# Patient Record
Sex: MALE | Race: BLACK OR AFRICAN AMERICAN | NOT HISPANIC OR LATINO
[De-identification: names, ages, dates, MRNs, and addresses within clinical notes are randomized per-mention and may not be internally consistent; named-entity substitution may affect disease eponyms.]

---

## 2017-12-27 ENCOUNTER — TRANSCRIPTION ENCOUNTER (OUTPATIENT)
Age: 52
End: 2017-12-27

## 2018-06-23 ENCOUNTER — TRANSCRIPTION ENCOUNTER (OUTPATIENT)
Age: 53
End: 2018-06-23

## 2020-04-26 ENCOUNTER — MESSAGE (OUTPATIENT)
Age: 55
End: 2020-04-26

## 2020-08-20 ENCOUNTER — APPOINTMENT (OUTPATIENT)
Dept: INTERNAL MEDICINE | Facility: CLINIC | Age: 55
End: 2020-08-20

## 2020-08-20 VITALS
HEART RATE: 76 BPM | BODY MASS INDEX: 24.11 KG/M2 | OXYGEN SATURATION: 98 % | DIASTOLIC BLOOD PRESSURE: 113 MMHG | TEMPERATURE: 98.1 F | WEIGHT: 178 LBS | SYSTOLIC BLOOD PRESSURE: 182 MMHG | HEIGHT: 72 IN

## 2020-08-24 ENCOUNTER — APPOINTMENT (OUTPATIENT)
Dept: INTERNAL MEDICINE | Facility: CLINIC | Age: 55
End: 2020-08-24
Payer: COMMERCIAL

## 2020-08-24 VITALS
SYSTOLIC BLOOD PRESSURE: 170 MMHG | TEMPERATURE: 98.3 F | DIASTOLIC BLOOD PRESSURE: 111 MMHG | HEART RATE: 89 BPM | BODY MASS INDEX: 24.11 KG/M2 | WEIGHT: 178 LBS | OXYGEN SATURATION: 98 % | RESPIRATION RATE: 14 BRPM | HEIGHT: 72 IN

## 2020-08-24 VITALS
OXYGEN SATURATION: 98 % | BODY MASS INDEX: 24.11 KG/M2 | TEMPERATURE: 98.3 F | HEART RATE: 89 BPM | DIASTOLIC BLOOD PRESSURE: 111 MMHG | WEIGHT: 178 LBS | HEIGHT: 72 IN | SYSTOLIC BLOOD PRESSURE: 170 MMHG

## 2020-08-24 DIAGNOSIS — E55.9 VITAMIN D DEFICIENCY, UNSPECIFIED: ICD-10-CM

## 2020-08-24 DIAGNOSIS — Z86.79 PERSONAL HISTORY OF OTHER DISEASES OF THE CIRCULATORY SYSTEM: ICD-10-CM

## 2020-08-24 DIAGNOSIS — Z78.9 OTHER SPECIFIED HEALTH STATUS: ICD-10-CM

## 2020-08-24 DIAGNOSIS — Z00.00 ENCOUNTER FOR GENERAL ADULT MEDICAL EXAMINATION W/OUT ABNORMAL FINDINGS: ICD-10-CM

## 2020-08-24 DIAGNOSIS — H35.019: ICD-10-CM

## 2020-08-24 DIAGNOSIS — Z72.3 LACK OF PHYSICAL EXERCISE: ICD-10-CM

## 2020-08-24 DIAGNOSIS — Z82.49 FAMILY HISTORY OF ISCHEMIC HEART DISEASE AND OTHER DISEASES OF THE CIRCULATORY SYSTEM: ICD-10-CM

## 2020-08-24 DIAGNOSIS — Z80.0 FAMILY HISTORY OF MALIGNANT NEOPLASM OF DIGESTIVE ORGANS: ICD-10-CM

## 2020-08-24 DIAGNOSIS — I45.10 UNSPECIFIED RIGHT BUNDLE-BRANCH BLOCK: ICD-10-CM

## 2020-08-24 LAB
25(OH)D3 SERPL-MCNC: 28.2 NG/ML
ALBUMIN SERPL ELPH-MCNC: 4.6 G/DL
ALP BLD-CCNC: 134 U/L
ALT SERPL-CCNC: 22 U/L
ANION GAP SERPL CALC-SCNC: 11 MMOL/L
APPEARANCE: CLEAR
AST SERPL-CCNC: 27 U/L
BACTERIA: NEGATIVE
BASOPHILS # BLD AUTO: 0.03 K/UL
BASOPHILS NFR BLD AUTO: 0.5 %
BILIRUB SERPL-MCNC: 0.8 MG/DL
BILIRUBIN URINE: NEGATIVE
BLOOD URINE: NEGATIVE
BUN SERPL-MCNC: 15 MG/DL
CALCIUM SERPL-MCNC: 9.6 MG/DL
CHLORIDE SERPL-SCNC: 105 MMOL/L
CHOLEST SERPL-MCNC: 214 MG/DL
CHOLEST/HDLC SERPL: 3.2 RATIO
CO2 SERPL-SCNC: 24 MMOL/L
COLOR: YELLOW
CREAT SERPL-MCNC: 1.14 MG/DL
EOSINOPHIL # BLD AUTO: 0.12 K/UL
EOSINOPHIL NFR BLD AUTO: 2 %
GLUCOSE QUALITATIVE U: NEGATIVE
GLUCOSE SERPL-MCNC: 94 MG/DL
HBV CORE IGG+IGM SER QL: NONREACTIVE
HBV SURFACE AB SER QL: NONREACTIVE
HBV SURFACE AG SER QL: NONREACTIVE
HCT VFR BLD CALC: 46.7 %
HCV AB SER QL: NONREACTIVE
HCV S/CO RATIO: 0.26 S/CO
HDLC SERPL-MCNC: 67 MG/DL
HEPATITIS A IGG ANTIBODY: REACTIVE
HGB BLD-MCNC: 15.1 G/DL
HIV1+2 AB SPEC QL IA.RAPID: NONREACTIVE
HYALINE CASTS: 0 /LPF
IMM GRANULOCYTES NFR BLD AUTO: 0.2 %
KETONES URINE: NEGATIVE
LDLC SERPL CALC-MCNC: 132 MG/DL
LEUKOCYTE ESTERASE URINE: NEGATIVE
LYMPHOCYTES # BLD AUTO: 2.13 K/UL
LYMPHOCYTES NFR BLD AUTO: 36 %
MAN DIFF?: NORMAL
MCHC RBC-ENTMCNC: 27.2 PG
MCHC RBC-ENTMCNC: 32.3 GM/DL
MCV RBC AUTO: 84 FL
MICROSCOPIC-UA: NORMAL
MONOCYTES # BLD AUTO: 0.3 K/UL
MONOCYTES NFR BLD AUTO: 5.1 %
NEUTROPHILS # BLD AUTO: 3.32 K/UL
NEUTROPHILS NFR BLD AUTO: 56.2 %
NITRITE URINE: NEGATIVE
PH URINE: 6.5
PLATELET # BLD AUTO: 227 K/UL
POTASSIUM SERPL-SCNC: 3.8 MMOL/L
PROT SERPL-MCNC: 7.7 G/DL
PROTEIN URINE: NORMAL
PSA SERPL-MCNC: 0.67 NG/ML
RBC # BLD: 5.56 M/UL
RBC # FLD: 13.9 %
RED BLOOD CELLS URINE: 5 /HPF
SODIUM SERPL-SCNC: 140 MMOL/L
SPECIFIC GRAVITY URINE: 1.02
SQUAMOUS EPITHELIAL CELLS: 0 /HPF
TRIGL SERPL-MCNC: 77 MG/DL
UROBILINOGEN URINE: NORMAL
WBC # FLD AUTO: 5.91 K/UL
WHITE BLOOD CELLS URINE: 1 /HPF

## 2020-08-24 PROCEDURE — 93000 ELECTROCARDIOGRAM COMPLETE: CPT

## 2020-08-24 PROCEDURE — 90471 IMMUNIZATION ADMIN: CPT

## 2020-08-24 PROCEDURE — 99386 PREV VISIT NEW AGE 40-64: CPT | Mod: 25

## 2020-08-24 PROCEDURE — 82270 OCCULT BLOOD FECES: CPT

## 2020-08-24 PROCEDURE — 90715 TDAP VACCINE 7 YRS/> IM: CPT

## 2020-08-24 NOTE — ASSESSMENT
[FreeTextEntry1] : health - he needs dtap vaccine pt explained vaccine side effects hand out given.  He needs colon cancer screening eye exam and is up to date with dental.  \par bmi 24  healthy eating , exercise  Weight loss, exercise, and diet control were discussed and are highly encouraged. Treatment options were given such as, aqua therapy, and contacting a nutritionist. Recommended to use the elliptical, stationary bike, less use of treadmill. Mindful eating was explained to the patient Obesity is associated with worsening asthma, shortness of breath, and potential for cardiac disease, diabetes, and other underlying medical conditions.\par \par \par 2. HPN  Pt exapined  etiology of hypertneison and risks .  \par DIETARY SALT (SODIUM); DASH DIET AND BLOOD PRESSURE:\par To decrease the sodium in your diet: \par · Use fresh vegetables and foods as much as possible.\par · Avoid canned and processed foods. Cured meats such as guan, ham, and sausages are high in salt.\par · Try using different herbs and spices in your cooking instead of salt.\par · In restaurants, avoid foods with sauces, cheese, and cured meats. Ask for low-sodium choices.\par To get more potassium in your diet, eat:\par · Bananas, fresh or dried apricots, peaches, citrus fruits, melons\par · Cauliflower, broccoli, tomatoes, carrots, raw spinach, beet greens, potatoes\par To get more magnesium in your diet, eat:\par · Whole grain foods, leafy green vegetables, dried fruits\par • Fish and seafood, poultry \par To get more calcium in your diet, eat:\par · Nonfat milk, yogurt, and low-fat cheeses \par · [place] and sardines\par · Cooked dried beans\par · Broccoli, kale, and bok adri\par · Tofu or soybean curd\par DASH stands for "dietary approaches to stop hypertension." The DASH diet is low in total and saturated fat. It is rich in fruits, vegetables, and low-fat dairy foods. The diet allows you to get natural fiber, calcium, and magnesium from food. It prevents or lowers high blood pressure. It can also help lower cholesterol in your blood. \par Don't change how you eat all at once. It's much more likely that you'll succeed if you make only one or two small changes at a time. Wait until those changes are a habit, then make a couple more changes. Some good starting steps include: \par Add one serving of vegetables to your meals at lunch and dinner. This is an easy way to help you get more vegetables in your diet. \par Have a piece of fruit as an afternoon or after-dinner snack. One glass of juice at breakfast is not enough fruit in your diet. \par Use half your usual amount of butter, margarine, or salad dressing. \par Buy nonfat salad dressing or nonfat sour cream.\par Follow this guide to select your menu of meals. The number of calories we want you to eat each day will tell you how many servings you can choose from each food group.\par Calories: 1600 2100 2600 3100 Servings Grains 6 7 ½ 10 ½ 12 ½ Vegetables 	 4 4 ½ 5 6 Fruit 4 5 5 6 Dairy (low-fat) 2 ½ 3 3 3 ½ Meat, poultry, fish ½ 1 ½ 2 2 ½ Nuts, seeds ½ ½ ½ 1 Fats and sweets 1 ½ 2 ½ 3 4\par Grains and grain products like breads and cereals provide energy, fiber and vitamins. Whole grains have more of these nutrients. One serving equals one of the following:\par Bagel, 1/2 medium; Barley, cooked 1/2 cup; Biscuit, country style 1 medium; Bread, whole wheat, white 1 slice; Cereals, cold or cooked, 1/2 cup; Cornbread, 1 medium piece; Crackers, mari, 2; Crackers, saltine, 4; Dinner roll, 1medium; English muffin, ½; Hamburger bun, ½; Muffin, 1 medium; Pancake, 1 medium; Pasta, 1/2 cup cooked; [place], 1/2 large or 1 small; Popcorn, 1 cup popped; Pretzels, 1 ounce; Rice, white, brown, or wild, 1/2 cup cooked; Tortillas, corn or flour, 1 medium; Waffle, 1 medium; Wheat germ, 1/4 cup; \par Vegetables are rich sources of potassium, magnesium, and fiber. One serving is 1/2 cup of any of the following cooked vegetables:\par Asparagus, Beans (green, yellow), Beets, Broccoli, [place] Sprouts, Carrots, Cauliflower, Earl, chicory, mustard and turnip (and other) greens, Corn, Kale, Lima beans, Mixed vegetables, Okra, Parsnips, Peas, green, Potatoes (1/2 medium or 1/2 cup mashed), Pumpkin, Rut[place], Spaghetti or tomato sauce, Spinach, Squash (zucchini or yellow), Stewed tomatoes, [place], Sweet potatoes, Turnips, Yam \par Raw vegetables: Carrots,1/2 cup; Celery, 1/2 cup; Lettuce (maureen, loose-leaf, green-leaf), 1 cup; Peppers, 1/2 cup; Spinach, 1 cup; Tomato, 1/2\par Fruits and fruit juices are important sources of potassium and magnesium. Fruits also contain fiber and are low in sodium and fat. One serving equals:\par Any fruit juice, # cup (6 ounces); Canned or frozen fruit, ½ cup (includes applesauce); Dried fruit, ¼ cup; \par Fresh fruit:\par Apple, 1 medium; Apricots, 2 medium; Banana, 1 medium; Berries, 1/2 cup; Melon, 1 wedge, or 1/2 cup; Cherries, 10; Grapefruit, 1/2; Grapes, 15; Kiwi, 1 medium; [place], 1/2 small; Nectarine, 1 medium; Orange, 1 medium; Peach, 1 medium; Pear, 1 medium; Pineapple, 1/2 cup; Plums, 2 medium; Tangerine, 1 large\par Dairy foods provide protein and calcium. Use low-fat or nonfat dairy products to cut down on fat. One serving equals:\par Skim milk, 1 cup (8 ounces); 1% low fat milk, 1 cup (8 ounces); 2% low fat milk, 1 cup (8 ounces) nonfat dry milk powder (1/3 cup); Low-fat cottage cheese, 1 cup (8 ounces); Parmesan cheese, 1 tablespoon; Mozzarella cheese, part skim, 1/4 cup (1 ounce); Low-fat cheddar cheese, 11/2 ounces; Ricotta cheese, part skim milk or nonfat, 1/4 cup (11/2 ounces); Other low fat or nonfat cheeses (11/2 oz.); Low-fat or nonfat yogurt, fruit-flavored or plain, 1 cup (8 ounces)\par Low-fat or nonfat frozen yogurt, 1/2 cup (4 ounces); Note: People who can't digest dairy products can try taking lactase enzyme pills or drops (available at drug and grocery stores) when they eat dairy. There is also milk available with the enzyme already added. Or you can buy lactose-free milk.\par Meat, poultry, and fish are good sources of protein and magnesium. One serving equals:\par Lean meat including beef, veal, or pork, 3 ounces cooked; Skinless, white meat poultry including turkey, chicken, 3 ounces; Fish and shellfish, 3 ounces cooked; Low-fat luncheon meats, 1 ounce; Egg, 1 medium; Tofu, 3 ounces\par Note: Three ounces of cooked meat is about the size of a deck of cards.\par Nuts, seeds, and legumes are rich sources of energy, magnesium, potassium, protein and fiber. Nuts and seeds are also high in fat, so portions should be small.\par Almonds, 1/3 cup; Beans such as kidney, vargas, and navy, 1/2 cup cooked; Chickpeas and lentils, 1/2 cup cooked; Cashews, 1/3 cup; Filberts, 1/3 cup; Mixed nuts, 1/3 cup; Peanut butter, 2 tablespoons; Peanuts, 1/3 cup; Sesame seeds, 2 tablespoons; Sunflower seeds, 2 tablespoons; Tofu, regular, 3 ounces; Walnuts, 1/3 cup \par Following the above diet will give you about 27% fat in your diet. The goal is to have 30% or less of the calories you eat each day be from fat. To meet that goal, do not eat more than 2-3 servings daily of added fat. Also try to limit sweets. One serving equals:\par Butter or margarine, 1 teaspoon; Mayonnaise, 1 teaspoon; Low-fat mayonnaise, 1 tablespoon; Salad dressing, 1 tablespoon; Low-fat salad dressing, 2 tablespoons; Oil, 1 teaspoon (use olive, canola, safflower, or other vegetable oils); Candy, hard, 3 pieces; Jelly or jam, 1 tablespoon); Jell-O, 1/2 cup; Jelly beans, 1/2 ounce; Maple syrup, 1 tablespoon; Popsicle, 1; Sherbet or nonfat or low-fat frozen yogurt, 1/2 cup; Sugar, 1 tablespoon; Sugared lemonade or fruit punch, 1 cup (8 ounces); Note: Try diet fruit-flavored gelatin or frozen, canned, or fresh fruit for dessert.\par \par Small amounts of alcohol may have benefits to the heart and blood pressure. However, excess use of alcohol can cause damage to the brain, liver and other organs. It can lead to high blood pressure. Drinking more than two drinks (15 ml) every day can raise your blood pressure. 15 ml of alcohol equals: \par • one 12-ounce bottle of beer \par • a half glass (5 ounces) of wine \par • 1 ounce (one shot) of 100 proof hard liquor\par 3.  colon cancer screening -  WE discussed procedure and will return prior for blood testing and instruction.  Colon and rectal cancer screening is a way in which doctors check the colon and rectum for signs of cancer or growths (called polyps) that might become cancer. It is done in people who have no symptoms and no reason to think they have cancer. The goal is to find and remove polyps before they become cancer, or to find cancer early, before it grows, spreads, or causes problems.\par \par The colon and rectum are the last part of the digestive tract (figure 1). When doctors talk about colon and rectal cancer screening, they use the term "colorectal." That is just a shorter way of saying "colon and rectal." It's also possible to say just colon cancer screening.\par \par Studies show that having colon cancer screening lowers the chance of dying from colon cancer. There are several different types of screening test that can do this.\par \par What are the different screening tests for colon cancer? — They include:\par \par ?Colonoscopy – Colonoscopy allows the doctor to see directly inside the entire colon. Before you can have a colonoscopy, you must clean out your colon. You do this at home by drinking a special liquid that causes watery diarrhea for several hours. On the day of the test, you get medicine to help you relax. Then a doctor puts a thin tube into your anus and advances it into your colon (figure 2). The tube has a tiny camera attached to it, so the doctor can see inside your colon. The tube also has tiny tools on the end, so the doctor can remove pieces of tissue or polyps if they are there. After polyps or pieces of tissue are removed, they are sent to a lab to be checked for cancer.\par \par \par •Advantages of this test – Colonoscopy finds most small polyps and almost all large polyps and cancers. If found, polyps can be removed right away. This test gives the most accurate results. If any other screening tests are done first and come back positive, a colonoscopy will need to be done for follow-up. If you have a colonoscopy as your first test, you will probably not need a second follow-up test soon after.\par \par \par •Drawbacks to this test – Colonoscopy has some small risks. It can cause bleeding or tear the inside of the colon, but this only happens in 1 out of 1,000 people. Also, cleaning out the bowel beforehand can be unpleasant. Plus, people usually cannot work or drive for the rest of the day after the test, because of the relaxation medicine they must take during the test.\par \par \par Sigmoidoscopy – A sigmoidoscopy is similar to a colonoscopy. The difference is that this test looks only at the last part of the colon, and a colonoscopy looks at the whole colon. Before you have a sigmoidoscopy, you must clean out the lower part of your colon using an enema. This bowel cleaning is not as thorough or unpleasant as the one for colonoscopy. For this test, you do not need to take medicines to help you relax, so you can drive and work afterward if you want.\par \par \par •Advantages of this test – Sigmoidoscopy can find polyps and cancers in the rectum and the last part of the colon. If polyps are found, they can be removed right away.\par \par \par •Drawbacks to this test – In about 2 out of 10,000 people, sigmoidoscopy tears the inside of the colon. The test also can't find polyps or cancers that are in the part of the colon the test does not view (figure 3). If doctors find polyps or cancer during a sigmoidoscopy, they usually follow up with a colonoscopy.\par \par \par CT colonography (also known as virtual colonoscopy or CTC) – CTC looks for cancer and polyps using a special X-ray called a "CT scan." For most CTC tests, the preparation is the same as it is for colonoscopy.\par \par \par •Advantages of this test – CTC can find polyps and cancers in the whole colon without the need for medicines to relax.\par \par \par •Drawbacks to this test – If doctors find polyps or cancer with CTC, they usually follow up with a colonoscopy. CTC sometimes finds areas that look abnormal but that turn out to be healthy. This means that CTC can lead to tests and procedures you did not need. Plus, CTC exposes you to radiation. In most cases, the preparation needed to clean the bowel is the same as the one needed for a colonoscopy. The test is expensive, and some insurance companies might not cover this test for screening.\par \par \par Stool test for blood – "Stool" is another word for bowel movements. Stool tests most commonly check for blood in samples of stool. Cancers and polyps can bleed, and if they bleed around the time you do the stool test, then blood will show up on the test. The test can find even small amounts of blood that you can't see in your stool. Other less serious conditions can also cause small amounts of blood in the stool, and that will show up in this test. You will have to collect small samples from your bowel movements, which you will put in a special container you get from your doctor or nurse. Then you follow the instructions to mail the container out for the testing.\par \par \par •Advantages of this test – This test does not involve cleaning out the colon or having any procedures.\par \par \par •Drawbacks to this test – Stool tests are less likely to find polyps than other screening tests. These tests also often come up abnormal even in people who do not have cancer. If a stool test shows something abnormal, doctors usually follow up with a colonoscopy.\par \par \par Stool DNA test – The stool DNA test checks for genetic markers of cancer, as well as for signs of blood. For this test, you get a special kit in order to collect a whole bowel movement. Then you follow the instructions about how and where to ship it.\par \par \par •Advantages of this test – This test does not involve cleaning out the colon or having any procedures. When cancer is not present, it is less likely to be falsely abnormal than a stool test for blood. That means it leads to fewer unnecessary colonoscopies.\par \par \par •Drawbacks to this test – It might be unpleasant to collect and ship a whole bowel movement. If a DNA test shows something abnormal, doctors usually follow up with a colonoscopy.\par \par \par There is no blood test that most experts think is accurate enough to use for screening.\par \par How do I choose which test to have? — Work with your doctor or nurse to decide which test is best for you. Some doctors might choose to combine screening tests, for example, sigmoidoscopy plus stool testing for blood. Being screened–no matter how–is more important than which test you choose.\par \par Who should be screened for colon cancer? — Doctors recommend that most people begin having colon cancer screening at age 50. People who have an increased risk of getting colon cancer sometimes begin screening at a younger age. That might include people with a strong family history of colon cancer, and people with diseases of the colon called "Crohn's disease" and "ulcerative colitis."\par \par Most people can stop being screened around the age of 75, or at the latest 85.\par \par How often should I be screened? — That depends on your risk of colon cancer and which test you have. People who have a high risk of colon cancer often need to be tested more often and should have a colonoscopy.\par \par Most people are not at high risk, so they can choose one of these schedules:\par \par Colonoscopy every 10 years\par \par CT colonography (CTC) every 5 years\par \par Sigmoidoscopy every 5 to 10 years\par \par Stool testing for blood once a year\par \par Stool DNA testing every 3 years (but doctors are not yet sure of the best time frame)\par 4.  abn ekg incomplete rbbb- refer to cardiologist  ekg  rate 71/min normal sinus  incomplete rbbb QQRS 102ms qr 404/439,s pr 190ms p 128 ms \par 5 av nicking refer to ophthalmologist . \par 6 hypercholesterolemia - High levels of lipids (fats) in the blood, including cholesterol and triglycerides, is also called "hyperlipidemia." Hyperlipidemia can significantly increase a person's risk of heart attacks, strokes, and other serious problems. To lower these risks, doctors often recommend that people with hyperlipidemia try to lower their cholesterol levels through a combination of dietary changes, exercise, and medication. Most cholesterol-lowering therapies are aimed at reducing low-density lipoprotein (LDL) or "bad" cholesterol. High levels of LDL can cause atherosclerosis (buildup of fatty deposits in the blood vessels), which is the major cause of cardiovascular events (heart attacks, strokes, and lower extremity or peripheral artery disease).\par This article will discuss the relationship between hyperlipidemia and cardiovascular disease, the different types of lipids, and expert recommendations for lipid screening. Treatment options for high cholesterol are discussed separately. HYPERLIPIDEMIA AND CARDIOVASCULAR DISEASE Hyperlipidemia can significantly increase a person's risk of developing cardiovascular disease, including disease of blood vessels supplying the heart (coronary artery disease), brain (cerebrovascular disease), and limbs (peripheral artery disease). These conditions happen when the blood vessels get clogged with fatty deposits, restricting blood flow. When this happens, it can lead to heart attacks, strokes, and other serious problems such as narrowing of the arteries that deliver blood to most organs.\par Other risk factors for cardiovascular disease — In addition to hyperlipidemia, there are a number of other factors that increase a person's risk of cardiovascular disease:\par ?Diabetes ?High blood pressure (hypertension)?Chronic kidney disease ?Cigarette smoking\par ?Having a parent or sibling who developed cardiovascular disease at a young age (<55 years for men or <65 years for women)\par Regardless of whether any of these factors are present, a person's risk for developing cardiovascular disease increases with age. Men have a higher risk than women at any age.\par Calculating your risk of cardiovascular disease — There are various online calculators that allow you to input information about yourself in order to estimate your risk of developing cardiovascular disease. Different calculators can give different scores depending on the variables they use in calculating a person's risk. Examples include:\par ?[place] 2008 data for men ?[place] 2008 data for women?American College of Cardiology/American Heart Association 2013 data for men and women Your health care provider can help you understand how to use the available calculators to better understand your risk and interpret the results.\par TYPES OF LIPIDSThe term "lipids" includes cholesterol and triglycerides, although there are other types of lipids, too. Standard lipid blood tests include a measurement of total cholesterol, low-density lipoprotein (LDL) and high-density lipoprotein (HDL) cholesterol, and triglycerides.\par Total cholesterol — A high total cholesterol level can increase your risk of cardiovascular disease. However, decisions about when to treat high cholesterol are usually based upon the level of LDL or HDL cholesterol rather than the level of total cholesterol . In general:\par ?A total cholesterol level of less than 200 mg/dL (5.17 mmol/L) is normal.\par ?A total cholesterol level of 200 to 239 mg/dL (5.17 to 6.18 mmol/L) is borderline high.\par ?A total cholesterol level of 240 mg/dL (6.21 mmol/L) or greater is high.\par The total cholesterol level can be measured any time of day. It is not necessary to fast (ie, avoid eating) before testing.\par LDL cholesterol — This is sometimes called "bad" cholesterol, as high LDL levels raise your risk of cardiovascular disease. Some health care providers make decisions about how to treat hyperlipidemia based on the LDL cholesterol level. Your goal LDL cholesterol depends on your overall risk for a cardiovascular event (heart attack or stroke). Several factors affect your personal risk, including whether you have a history of cardiovascular disease and your risk of developing cardiovascular disease in the future (based on your age, sex, and other major risk factors)People at higher risk are often given a lower LDL cholesterol goal.\par If your health care provider plans to measure your LDL cholesterol level, he or she may ask you to fast (avoid eating) for nine hours or longer in order to obtain an accurate result. A fasting test is more important if you have elevated triglycerides (>200 mg/dL) (see ) or when your health care provider plans to measure your fasting blood sugar (glucose). However, in many cases, your LDL cholesterol can be measured even after you have eaten recently.\par HDL cholesterol — Not all cholesterol is bad. High levels of HDL ("good") cholesterol actually lower your risk of cardiovascular disease. A level of 60 mg/dL (1.55 mmol/L) or higher is excellent, while levels of HDL cholesterol less than 40 mg/dL (1.03 mmol/L) are considered lower than desirable. There is no treatment that lowers your risk for a cardiovascular event by raising HDL cholesterol. \par As with total cholesterol, the HDL cholesterol can be measured with a blood test at any time, regardless of whether you have been fasting.\par Non-HDL cholesterol — "Non-HDL" cholesterol includes LDL cholesterol as well as other types of plaque-forming lipids that do not fall into these categories. Non-HDL cholesterol accounts for the cholesterol carried by very low density lipoproteins (VLDL), intermediate density lipoproteins (IDL), and lipoprotein (a). It can be calculated by subtracting HDL cholesterol from total cholesterol. Since total cholesterol and HDL cholesterol can be measured accurately without fasting, so can non-HDL cholesterol. Non-HDL cholesterol is generally considered a better predictor of cardiovascular risk than LDL cholesterol.\par An appropriate non-HDL cholesterol goal can be calculated by adding 30 mg/dL (0.78 mmol/L) to your LDL cholesterol goal. As discussed, the LDL cholesterol goal depends on a number of factors. \par

## 2020-08-24 NOTE — HEALTH RISK ASSESSMENT
[Very Good] : ~his/her~  mood as very good [No falls in past year] : Patient reported no falls in the past year [No] : No [0] : 1) Little interest or pleasure doing things: Not at all (0) [HIV Test offered] : HIV Test offered [Hepatitis C test offered] : Hepatitis C test offered [None] : None [With Family] : lives with family [Employed] : employed [# of Members in Household ___] :  household currently consist of [unfilled] member(s) [College] : College [] :  [# Of Children ___] : has [unfilled] children [Sexually Active] : sexually active [Fully functional (bathing, dressing, toileting, transferring, walking, feeding)] : Fully functional (bathing, dressing, toileting, transferring, walking, feeding) [Feels Safe at Home] : Feels safe at home [Fully functional (using the telephone, shopping, preparing meals, housekeeping, doing laundry, using] : Fully functional and needs no help or supervision to perform IADLs (using the telephone, shopping, preparing meals, housekeeping, doing laundry, using transportation, managing medications and managing finances) [Smoke Detector] : smoke detector [Carbon Monoxide Detector] : carbon monoxide detector [Safety elements used in home] : safety elements used in home [Seat Belt] :  uses seat belt [FreeTextEntry1] : none  [] : No [de-identified] : none  [de-identified] : none  [[id number]] : 0 [Change in mental status noted] : No change in mental status noted [Language] : denies difficulty with language [Behavior] : denies difficulty with behavior [Learning/Retaining New Information] : denies difficulty learning/retaining new information [Handling Complex Tasks] : denies difficulty handling complex tasks [Reasoning] : denies difficulty with reasoning [Spatial Ability and Orientation] : denies difficulty with spatial ability and orientation [Reports changes in hearing] : Reports no changes in hearing [Reports changes in vision] : Reports no changes in vision [Guns at Home] : no guns at home [Reports changes in dental health] : Reports no changes in dental health [Sunscreen] : does not use sunscreen [TB Exposure] : is not being exposed to tuberculosis [Travel to Developing Areas] : does not  travel to developing areas [Caregiver Concerns] : does not have caregiver concerns [ColonoscopyDate] : none  [FreeTextEntry2] :  [de-identified] : last eye exam 1 yr  [de-identified] : few months ago  [AdvancecareDate] : 08/20

## 2020-08-24 NOTE — HISTORY OF PRESENT ILLNESS
[FreeTextEntry1] : cpe [de-identified] : Pt is a 54 yr old man who has hypertension on  amlodipine and hyperlipidemia./

## 2020-08-24 NOTE — PHYSICAL EXAM
[Normal Voice Quality] : was normal [Well Developed] : well developed [Well Nourished] : well nourished [Normal Verbal Skills] : the patient had normal verbal communication skills [Normal Nonverbal Skills] : normal nonverbal communication skills were demonstrated [Conjunctiva] : the conjunctiva were normal in both eyes [EOM Intact] : extraocular movements were intact [PERRL] : pupils were equal in size, round, and reactive to light [Normal Appearance] : was normal in appearance [Retinal Vessels A-V Crossing Defects] : A-V crossing defects noted [Neck Supple] : was supple [Rate ___] : at [unfilled] breaths per minute [Normal Rhythm/Effort] : normal respiratory rhythm and effort [Clear Bilaterally] : the lungs were clear to auscultation bilaterally [Normal to Percussion] : the lungs were normal to percussion [5th Left ICS - MCL] : palpated at the 5th LICS in the midclavicular line [Rhythm Regular] : regular [Heart Rate ___] : [unfilled] bpm [Normal Rate] : normal [Normal S1] : normal S1 [Normal S2] : normal S2 [No Murmur] : no murmurs heard [No Pitting Edema] : no pitting edema present [No Abnormalities] : the abdominal aorta was not enlarged and no bruit was heard [2+] : right 2+ [Examination Of The Breasts] : a normal appearance [No Discharge] : no discharge [Soft, Nontender] : the abdomen was soft and nontender [No Mass] : no masses were palpated [No HSM] : no hepatosplenomegaly noted [None] : no CVA tenderness [Normal rectal exam] : was normal [Stool Sample Taken] : a stool sample was obtained [Epididymis] : the epididymides were normal [Urinary Bladder Findings] : the bladder was normal on palpation [Scrotum] : the scrotum was normal [Urethral Meatus] : meatus normal [Testes Tenderness] : no tenderness of the testes [Testes Mass (___cm)] : there were no testicular masses [No Prostate Nodules] : no prostate nodules [No Lymphangitis] : no lymphangitis observed [Normal Kyphosis] : normal kyphosis [Normal Lordosis] : normal lordosis [No Visual Abnormalities] : no visible abnormalities [No Tenderness to Palpation] : no spine tenderness on palpation [No Scoliosis] : no scoliosis [No Masses] : no masses [Full ROM] : full ROM [No Pain with ROM] : no pain with motion in any direction [Intact] : all reflexes within normal limits bilaterally [Normal Station and Gait] : the gait and station were normal [Normal Motor Tone] : the muscle tone was normal [Involuntary Movements] : no involuntary movements were seen [Normal Scalp] : inspection of the scalp showed no abnormalities [Examination Of The Hair] : texture and distribution of hair was normal [Complexion Dark] : dark complexion [Normal] : coordination was normal [Normal Mental Status] : the patient's orientation, memory, attention, language and fund of knowledge were normal [Appropriate] : appropriate [Enlarged Diffusely] : was not enlarged [JVP Elevated ___cm] : the JVP was not elevated [S3] : no S3 [S4] : no S4 [Rt] : no varicose veins of the right leg [Lt] : no varicose veins of the left leg [Right Carotid Bruit] : no bruit heard over the right carotid [Left Carotid Bruit] : no bruit heard over the left carotid [Right Femoral Bruit] : no bruit heard over the right femoral artery [Left Femoral Bruit] : no bruit heard over the left femoral artery [Bruit] : no bruit heard [Internal Hemorrhoid] : no internal hemorrhoids were present [External Hemorrhoid] : no external hemorrhoids were present [Occult Blood Positive] : was negative for occult blood [Gross Blood] : no gross blood [Fecal Impaction] : no fecal impaction was present [Postauricular Lymph Nodes Enlarged Bilaterally] : nodes not enlarged [Preauricular Lymph Nodes Enlarged Bilaterally] : nodes not enlarged [Submandibular Lymph Nodes Enlarged Bilaterally] : nodes not enlarged [Suboccipital Lymph Nodes Enlarged Bilaterally] : nodes not enlarged [Cervical Lymph Nodes Enlarged Anterior Bilaterally] : nodes not enlarged [Cervical Lymph Nodes Enlarged Posterior Bilaterally] : nodes not enlarged [Submental Lymph Nodes Enlarged] : nodes not enlarged [Axillary Lymph Nodes Enlarged Bilaterally] : nodes not enlarged [Supraclavicular Lymph Nodes Enlarged Bilaterally] : nodes not enlarged [Femoral Lymph Nodes Enlarged Bilaterally] : nodes not enlarged [Epitrochlear Lymph Nodes Enlarged Bilaterally] : nodes not enlarged [Inguinal Lymph Nodes Enlarged Bilaterally] : nodes not enlarged [Abnormal Color] : normal color and pigmentation [Skin Lesions 1] : no skin lesions were observed [Tattoo - Single] : no tattoos observed [Skin Turgor Decreased] : normal skin turgor [Impaired judgment] : intact judgment [Impaired Insight] : intact insight [de-identified] : teeth  missing molars  on upper and lower  tongue normal gums normal

## 2020-08-24 NOTE — COUNSELING
[Sleep ___ hours/day] : Sleep [unfilled] hours/day [Engage in a relaxing activity] : Engage in a relaxing activity [Plan in advance] : Plan in advance [Target Wt Loss Goal ___] : Weight Loss Goals: Target weight loss goal [unfilled] lbs [____ min/wk Activity] : [unfilled] min/wk activity [None] : None [FreeTextEntry2] : bmi 24 160- 184  he is 89

## 2020-08-25 LAB — T PALLIDUM AB SER QL IA: NEGATIVE

## 2020-08-26 LAB
M TB IFN-G BLD-IMP: POSITIVE
QUANTIFERON TB PLUS MITOGEN MINUS NIL: 6.08 IU/ML
QUANTIFERON TB PLUS NIL: 0.06 IU/ML
QUANTIFERON TB PLUS TB1 MINUS NIL: 1.55 IU/ML
QUANTIFERON TB PLUS TB2 MINUS NIL: 1.47 IU/ML

## 2020-10-07 ENCOUNTER — APPOINTMENT (OUTPATIENT)
Dept: INTERNAL MEDICINE | Facility: CLINIC | Age: 55
End: 2020-10-07

## 2020-10-13 ENCOUNTER — APPOINTMENT (OUTPATIENT)
Dept: UROLOGY | Facility: CLINIC | Age: 55
End: 2020-10-13
Payer: COMMERCIAL

## 2020-10-13 ENCOUNTER — APPOINTMENT (OUTPATIENT)
Dept: INTERNAL MEDICINE | Facility: CLINIC | Age: 55
End: 2020-10-13
Payer: COMMERCIAL

## 2020-10-13 ENCOUNTER — APPOINTMENT (OUTPATIENT)
Dept: CARDIOLOGY | Facility: CLINIC | Age: 55
End: 2020-10-13
Payer: COMMERCIAL

## 2020-10-13 VITALS
HEIGHT: 72 IN | BODY MASS INDEX: 24.24 KG/M2 | TEMPERATURE: 98 F | OXYGEN SATURATION: 98 % | HEART RATE: 82 BPM | SYSTOLIC BLOOD PRESSURE: 195 MMHG | DIASTOLIC BLOOD PRESSURE: 115 MMHG | WEIGHT: 179 LBS

## 2020-10-13 VITALS
HEIGHT: 72 IN | HEART RATE: 77 BPM | WEIGHT: 179 LBS | BODY MASS INDEX: 24.24 KG/M2 | SYSTOLIC BLOOD PRESSURE: 191 MMHG | DIASTOLIC BLOOD PRESSURE: 106 MMHG | OXYGEN SATURATION: 98 % | TEMPERATURE: 98.7 F

## 2020-10-13 DIAGNOSIS — I10 ESSENTIAL (PRIMARY) HYPERTENSION: ICD-10-CM

## 2020-10-13 DIAGNOSIS — N47.1 PHIMOSIS: ICD-10-CM

## 2020-10-13 DIAGNOSIS — Z23 ENCOUNTER FOR IMMUNIZATION: ICD-10-CM

## 2020-10-13 DIAGNOSIS — R31.29 OTHER MICROSCOPIC HEMATURIA: ICD-10-CM

## 2020-10-13 DIAGNOSIS — Z86.79 PERSONAL HISTORY OF OTHER DISEASES OF THE CIRCULATORY SYSTEM: ICD-10-CM

## 2020-10-13 DIAGNOSIS — Z12.11 ENCOUNTER FOR SCREENING FOR MALIGNANT NEOPLASM OF COLON: ICD-10-CM

## 2020-10-13 PROCEDURE — 99204 OFFICE O/P NEW MOD 45 MIN: CPT

## 2020-10-13 PROCEDURE — 99204 OFFICE O/P NEW MOD 45 MIN: CPT | Mod: 25

## 2020-10-13 PROCEDURE — 76775 US EXAM ABDO BACK WALL LIM: CPT

## 2020-10-13 PROCEDURE — 99215 OFFICE O/P EST HI 40 MIN: CPT

## 2020-10-13 PROCEDURE — 99205 OFFICE O/P NEW HI 60 MIN: CPT

## 2020-10-13 NOTE — ASSESSMENT
[FreeTextEntry1] : Very pleasant 54-year-old gentleman who presents for evaluation of microscopic hematuria and phimosis\par -urinalysis reviewed\par -urine culture\par -Renal ultrasound today demonstrates concern for a 5 mm right intrarenal stone\par -CT Urogram\par -cystoscopy\par -Extensive discussion of the potential etiologies of microscopic hematuria, as well as the need to complete full work up.  Patient understands and wishes to proceed.\par -I discussed the risks, benefits and alternatives to circumcision with the patient, including but not limited to bleeding, infection, pain, irritation of the glans, and penile injury.  I also discussed the need to refrain from sexual intercourse for 6 weeks, or until the circumcision incision heals.  We will discuss this further at his next appointment.

## 2020-10-13 NOTE — ASSESSMENT
[FreeTextEntry1] : colon cancer screening-   WE discussed procedure and will return prior for blood testing and instruction.  Colon and rectal cancer screening is a way in which doctors check the colon and rectum for signs of cancer or growths (called polyps) that might become cancer. It is done in people who have no symptoms and no reason to think they have cancer. The goal is to find and remove polyps before they become cancer, or to find cancer early, before it grows, spreads, or causes problems.\par \par The colon and rectum are the last part of the digestive tract (figure 1). When doctors talk about colon and rectal cancer screening, they use the term "colorectal." That is just a shorter way of saying "colon and rectal." It's also possible to say just colon cancer screening.\par \par Studies show that having colon cancer screening lowers the chance of dying from colon cancer. There are several different types of screening test that can do this.\par \par What are the different screening tests for colon cancer? — They include:\par \par ?Colonoscopy – Colonoscopy allows the doctor to see directly inside the entire colon. Before you can have a colonoscopy, you must clean out your colon. You do this at home by drinking a special liquid that causes watery diarrhea for several hours. On the day of the test, you get medicine to help you relax. Then a doctor puts a thin tube into your anus and advances it into your colon (figure 2). The tube has a tiny camera attached to it, so the doctor can see inside your colon. The tube also has tiny tools on the end, so the doctor can remove pieces of tissue or polyps if they are there. After polyps or pieces of tissue are removed, they are sent to a lab to be checked for cancer.\par \par \par •Advantages of this test – Colonoscopy finds most small polyps and almost all large polyps and cancers. If found, polyps can be removed right away. This test gives the most accurate results. If any other screening tests are done first and come back positive, a colonoscopy will need to be done for follow-up. If you have a colonoscopy as your first test, you will probably not need a second follow-up test soon after.\par \par \par •Drawbacks to this test – Colonoscopy has some small risks. It can cause bleeding or tear the inside of the colon, but this only happens in 1 out of 1,000 people. Also, cleaning out the bowel beforehand can be unpleasant. Plus, people usually cannot work or drive for the rest of the day after the test, because of the relaxation medicine they must take during the test.\par \par \par Sigmoidoscopy – A sigmoidoscopy is similar to a colonoscopy. The difference is that this test looks only at the last part of the colon, and a colonoscopy looks at the whole colon. Before you have a sigmoidoscopy, you must clean out the lower part of your colon using an enema. This bowel cleaning is not as thorough or unpleasant as the one for colonoscopy. For this test, you do not need to take medicines to help you relax, so you can drive and work afterward if you want.\par \par \par •Advantages of this test – Sigmoidoscopy can find polyps and cancers in the rectum and the last part of the colon. If polyps are found, they can be removed right away.\par \par \par •Drawbacks to this test – In about 2 out of 10,000 people, sigmoidoscopy tears the inside of the colon. The test also can't find polyps or cancers that are in the part of the colon the test does not view (figure 3). If doctors find polyps or cancer during a sigmoidoscopy, they usually follow up with a colonoscopy.\par \par \par CT colonography (also known as virtual colonoscopy or CTC) – CTC looks for cancer and polyps using a special X-ray called a "CT scan." For most CTC tests, the preparation is the same as it is for colonoscopy.\par \par \par •Advantages of this test – CTC can find polyps and cancers in the whole colon without the need for medicines to relax.\par \par \par •Drawbacks to this test – If doctors find polyps or cancer with CTC, they usually follow up with a colonoscopy. CTC sometimes finds areas that look abnormal but that turn out to be healthy. This means that CTC can lead to tests and procedures you did not need. Plus, CTC exposes you to radiation. In most cases, the preparation needed to clean the bowel is the same as the one needed for a colonoscopy. The test is expensive, and some insurance companies might not cover this test for screening.\par \par \par Stool test for blood – "Stool" is another word for bowel movements. Stool tests most commonly check for blood in samples of stool. Cancers and polyps can bleed, and if they bleed around the time you do the stool test, then blood will show up on the test. The test can find even small amounts of blood that you can't see in your stool. Other less serious conditions can also cause small amounts of blood in the stool, and that will show up in this test. You will have to collect small samples from your bowel movements, which you will put in a special container you get from your doctor or nurse. Then you follow the instructions to mail the container out for the testing.\par \par \par •Advantages of this test – This test does not involve cleaning out the colon or having any procedures.\par \par \par •Drawbacks to this test – Stool tests are less likely to find polyps than other screening tests. These tests also often come up abnormal even in people who do not have cancer. If a stool test shows something abnormal, doctors usually follow up with a colonoscopy.\par \par \par Stool DNA test – The stool DNA test checks for genetic markers of cancer, as well as for signs of blood. For this test, you get a special kit in order to collect a whole bowel movement. Then you follow the instructions about how and where to ship it.\par \par \par •Advantages of this test – This test does not involve cleaning out the colon or having any procedures. When cancer is not present, it is less likely to be falsely abnormal than a stool test for blood. That means it leads to fewer unnecessary colonoscopies.\par \par \par •Drawbacks to this test – It might be unpleasant to collect and ship a whole bowel movement. If a DNA test shows something abnormal, doctors usually follow up with a colonoscopy.\par \par \par There is no blood test that most experts think is accurate enough to use for screening.\par \par How do I choose which test to have? — Work with your doctor or nurse to decide which test is best for you. Some doctors might choose to combine screening tests, for example, sigmoidoscopy plus stool testing for blood. Being screened–no matter how–is more important than which test you choose.\par \par Who should be screened for colon cancer? — Doctors recommend that most people begin having colon cancer screening at age 50. People who have an increased risk of getting colon cancer sometimes begin screening at a younger age. That might include people with a strong family history of colon cancer, and people with diseases of the colon called "Crohn's disease" and "ulcerative colitis."\par \par Most people can stop being screened around the age of 75, or at the latest 85.\par \par How often should I be screened? — That depends on your risk of colon cancer and which test you have. People who have a high risk of colon cancer often need to be tested more often and should have a colonoscopy.\par \par Most people are not at high risk, so they can choose one of these schedules:\par \par Colonoscopy every 10 years\par \par CT colonography (CTC) every 5 years\par \par Sigmoidoscopy every 5 to 10 years\par \par Stool testing for blood once a year\par \par Stool DNA testing every 3 years (but doctors are not yet sure of the best time frame)\par The risks, benefits, and alternatives were explained in detail to the patient. Risks including but not limited to bleeding, perforation, adverse reaction to medications, cardiopulmonary compromise and their attendant sequalae explained. Sequelae including but not limited to need for surgery, colostomy, prolonged hospital stay, placement of drainage tubes, blood transfusions, disability, morbidity and mortality was explained. \par It has been made clear to the patient that although colonoscopy is still considered the best test to screen for colon cancer and polyps, no test is 100% accurate. He/she indicated understanding of the above and wishes to proceed. \par All questions and concerns have been addressed. \par \par \par 2 hpn Pt has not taken his medication because of a problem with the pharmacy I resent the rx today.  he will get it  and take it and I will recheck his bp.  \par DIETARY SALT (SODIUM); DASH DIET AND BLOOD PRESSURE:\par To decrease the sodium in your diet: \par · Use fresh vegetables and foods as much as possible.\par · Avoid canned and processed foods. Cured meats such as guan, ham, and sausages are high in salt.\par · Try using different herbs and spices in your cooking instead of salt.\par · In restaurants, avoid foods with sauces, cheese, and cured meats. Ask for low-sodium choices.\par To get more potassium in your diet, eat:\par · Bananas, fresh or dried apricots, peaches, citrus fruits, melons\par · Cauliflower, broccoli, tomatoes, carrots, raw spinach, beet greens, potatoes\par To get more magnesium in your diet, eat:\par · Whole grain foods, leafy green vegetables, dried fruits\par • Fish and seafood, poultry \par To get more calcium in your diet, eat:\par · Nonfat milk, yogurt, and low-fat cheeses \par · [place] and sardines\par · Cooked dried beans\par · Broccoli, kale, and bok adri\par · Tofu or soybean curd\par DASH stands for "dietary approaches to stop hypertension." The DASH diet is low in total and saturated fat. It is rich in fruits, vegetables, and low-fat dairy foods. The diet allows you to get natural fiber, calcium, and magnesium from food. It prevents or lowers high blood pressure. It can also help lower cholesterol in your blood. \par Don't change how you eat all at once. It's much more likely that you'll succeed if you make only one or two small changes at a time. Wait until those changes are a habit, then make a couple more changes. Some good starting steps include: \par Add one serving of vegetables to your meals at lunch and dinner. This is an easy way to help you get more vegetables in your diet. \par Have a piece of fruit as an afternoon or after-dinner snack. One glass of juice at breakfast is not enough fruit in your diet. \par Use half your usual amount of butter, margarine, or salad dressing. \par Buy nonfat salad dressing or nonfat sour cream.\par Follow this guide to select your menu of meals. The number of calories we want you to eat each day will tell you how many servings you can choose from each food group.\par Calories: 1600 2100 2600 3100 Servings Grains 6 7 ½ 10 ½ 12 ½ Vegetables 	 4 4 ½ 5 6 Fruit 4 5 5 6 Dairy (low-fat) 2 ½ 3 3 3 ½ Meat, poultry, fish ½ 1 ½ 2 2 ½ Nuts, seeds ½ ½ ½ 1 Fats and sweets 1 ½ 2 ½ 3 4\par Grains and grain products like breads and cereals provide energy, fiber and vitamins. Whole grains have more of these nutrients. One serving equals one of the following:\par Bagel, 1/2 medium; Barley, cooked 1/2 cup; Biscuit, country style 1 medium; Bread, whole wheat, white 1 slice; Cereals, cold or cooked, 1/2 cup; Cornbread, 1 medium piece; Crackers, mari, 2; Crackers, saltine, 4; Dinner roll, 1medium; English muffin, ½; Hamburger bun, ½; Muffin, 1 medium; Pancake, 1 medium; Pasta, 1/2 cup cooked; [place], 1/2 large or 1 small; Popcorn, 1 cup popped; Pretzels, 1 ounce; Rice, white, brown, or wild, 1/2 cup cooked; Tortillas, corn or flour, 1 medium; Waffle, 1 medium; Wheat germ, 1/4 cup; \par Vegetables are rich sources of potassium, magnesium, and fiber. One serving is 1/2 cup of any of the following cooked vegetables:\par Asparagus, Beans (green, yellow), Beets, Broccoli, [place] Sprouts, Carrots, Cauliflower, Earl, chicory, mustard and turnip (and other) greens, Corn, Kale, Lima beans, Mixed vegetables, Okra, Parsnips, Peas, green, Potatoes (1/2 medium or 1/2 cup mashed), Pumpkin, Rutabaga, Spaghetti or tomato sauce, Spinach, Squash (zucchini or yellow), Stewed tomatoes, Succotash, Sweet potatoes, Turnips, Yam \par Raw vegetables: Carrots,1/2 cup; Celery, 1/2 cup; Lettuce (maureen, loose-leaf, green-leaf), 1 cup; Peppers, 1/2 cup; Spinach, 1 cup; Tomato, 1/2\par Fruits and fruit juices are important sources of potassium and magnesium. Fruits also contain fiber and are low in sodium and fat. One serving equals:\par Any fruit juice, # cup (6 ounces); Canned or frozen fruit, ½ cup (includes applesauce); Dried fruit, ¼ cup; \par Fresh fruit:\par Apple, 1 medium; Apricots, 2 medium; Banana, 1 medium; Berries, 1/2 cup; Melon, 1 wedge, or 1/2 cup; Cherries, 10; Grapefruit, 1/2; Grapes, 15; Kiwi, 1 medium; Sunsites, 1/2 small; Nectarine, 1 medium; Orange, 1 medium; Peach, 1 medium; Pear, 1 medium; Pineapple, 1/2 cup; Plums, 2 medium; Tangerine, 1 large\par Dairy foods provide protein and calcium. Use low-fat or nonfat dairy products to cut down on fat. One serving equals:\par Skim milk, 1 cup (8 ounces); 1% low fat milk, 1 cup (8 ounces); 2% low fat milk, 1 cup (8 ounces) nonfat dry milk powder (1/3 cup); Low-fat cottage cheese, 1 cup (8 ounces); Parmesan cheese, 1 tablespoon; Mozzarella cheese, part skim, 1/4 cup (1 ounce); Low-fat cheddar cheese, 11/2 ounces; Ricotta cheese, part skim milk or nonfat, 1/4 cup (11/2 ounces); Other low fat or nonfat cheeses (11/2 oz.); Low-fat or nonfat yogurt, fruit-flavored or plain, 1 cup (8 ounces)\par Low-fat or nonfat frozen yogurt, 1/2 cup (4 ounces); Note: People who can't digest dairy products can try taking lactase enzyme pills or drops (available at drug and grocery stores) when they eat dairy. There is also milk available with the enzyme already added. Or you can buy lactose-free milk.\par Meat, poultry, and fish are good sources of protein and magnesium. One serving equals:\par Lean meat including beef, veal, or pork, 3 ounces cooked; Skinless, white meat poultry including turkey, chicken, 3 ounces; Fish and shellfish, 3 ounces cooked; Low-fat luncheon meats, 1 ounce; Egg, 1 medium; Tofu, 3 ounces\par Note: Three ounces of cooked meat is about the size of a deck of cards.\par Nuts, seeds, and legumes are rich sources of energy, magnesium, potassium, protein and fiber. Nuts and seeds are also high in fat, so portions should be small.\par Almonds, 1/3 cup; Beans such as kidney, vargas, and navy, 1/2 cup cooked; Chickpeas and lentils, 1/2 cup cooked; Cashews, 1/3 cup; Filberts, 1/3 cup; Mixed nuts, 1/3 cup; Peanut butter, 2 tablespoons; Peanuts, 1/3 cup; Sesame seeds, 2 tablespoons; Sunflower seeds, 2 tablespoons; Tofu, regular, 3 ounces; Walnuts, 1/3 cup \par Following the above diet will give you about 27% fat in your diet. The goal is to have 30% or less of the calories you eat each day be from fat. To meet that goal, do not eat more than 2-3 servings daily of added fat. Also try to limit sweets. One serving equals:\par Butter or margarine, 1 teaspoon; Mayonnaise, 1 teaspoon; Low-fat mayonnaise, 1 tablespoon; Salad dressing, 1 tablespoon; Low-fat salad dressing, 2 tablespoons; Oil, 1 teaspoon (use olive, canola, safflower, or other vegetable oils); Candy, hard, 3 pieces; Jelly or jam, 1 tablespoon); Jell-O, 1/2 cup; Jelly beans, 1/2 ounce; Maple syrup, 1 tablespoon; Popsicle, 1; Sherbet or nonfat or low-fat frozen yogurt, 1/2 cup; Sugar, 1 tablespoon; Sugared lemonade or fruit punch, 1 cup (8 ounces); Note: Try diet fruit-flavored gelatin or frozen, canned, or fresh fruit for dessert.\par \par Small amounts of alcohol may have benefits to the heart and blood pressure. However, excess use of alcohol can cause damage to the brain, liver and other organs. It can lead to high blood pressure. Drinking more than two drinks (15 ml) every day can raise your blood pressure. 15 ml of alcohol equals: \par • one 12-ounce bottle of beer \par • a half glass (5 ounces) of wine \par • 1 ounce (one shot) of 100 proof hard liquor\par \par \par 3 hematuria - Hematuria may be a symptom of an underlying disease, some of which are life threatening and some of which are treatable . The causes vary with age, with the most common being inflammation or infection of the prostate or bladder, stones, and, in older patients, a kidney or urinary tract malignancy or benign prostatic hyperplasia (BPH) . )\par 4 positive quantiferon pt states he had bcg vaccine and has always been positive and negative chest xray .  he has never been treated for tb.  He will go for chest xray and  will do ppd.

## 2020-10-13 NOTE — HISTORY OF PRESENT ILLNESS
[FreeTextEntry1] : Very pleasant 54 year old gentleman who presents for evaluation of microscopic hematuria found on urinalysis approximately 6 weeks ago to 5 red blood cells per high-powered field.  PSA normal.. Patient reports no history of gross hematuria.  No flank pain. No suprapubic pain. No dysuria.  No urinary frequency, urgency. No history of urinary tract infections or renal impairment. No aggravating or alleviating factors that he knows of contributing to hematuria.  He reports a history of a kidney stone in the past which he passed spontaneously.  Patient is interested in a circumcision.  He reports painful erections due to phimosis.\par \par No family history of  malignancy, including prostate cancer.  No family history of nephrolithiasis.  He does not smoke.\par  [Hematuria - Microscopic] : microscopic hematuria

## 2020-10-13 NOTE — PHYSICAL EXAM
[Well Developed] : well developed [Well Nourished] : well nourished [Sclera] : the sclera and conjunctiva were normal [PERRL With Normal Accommodation] : pupils were equal in size, round, and reactive to light [Extraocular Movements] : extraocular movements were intact [Outer Ear] : the ears and nose were normal in appearance [Oropharynx] : the oropharynx was normal [Neck Appearance] : the appearance of the neck was normal [] : the neck was supple [Neck Cervical Mass (___cm)] : no neck mass was observed [Jugular Venous Distention Increased] : there was no jugular-venous distention [Rate ___] : at [unfilled] breaths per minute [Normal Rhythm/Effort] : normal respiratory rhythm and effort [Clear Bilaterally] : the lungs were clear to auscultation bilaterally [Normal to Percussion] : the lungs were normal to percussion [5th Left ICS - MCL] : palpated at the 5th LICS in the midclavicular line [Normal Rate] : normal [Heart Rate ___] : [unfilled] bpm [Rhythm Regular] : regular [Normal S1] : normal S1 [Normal S2] : normal S2 [S3] : no S3 [S4] : no S4 [No Murmur] : no murmurs heard [No Pitting Edema] : no pitting edema present [Right Carotid Bruit] : no bruit heard over the right carotid [Left Carotid Bruit] : no bruit heard over the left carotid [Right Femoral Bruit] : no bruit heard over the right femoral artery [Left Femoral Bruit] : no bruit heard over the left femoral artery [2+] : left 2+ [No Abnormalities] : the abdominal aorta was not enlarged and no bruit was heard [Soft, Nontender] : the abdomen was soft and nontender [No Mass] : no masses were palpated [No HSM] : no hepatosplenomegaly noted [None] : no CVA tenderness [Postauricular Lymph Nodes Enlarged Bilaterally] : nodes not enlarged [Preauricular Lymph Nodes Enlarged Bilaterally] : nodes not enlarged [Submandibular Lymph Nodes Enlarged Bilaterally] : nodes not enlarged [Suboccipital Lymph Nodes Enlarged Bilaterally] : nodes not enlarged [Submental Lymph Nodes Enlarged] : nodes not enlarged [Cervical Lymph Nodes Enlarged Posterior Bilaterally] : nodes not enlarged [Cervical Lymph Nodes Enlarged Anterior Bilaterally] : nodes not enlarged [Supraclavicular Lymph Nodes Enlarged Bilaterally] : nodes not enlarged [Axillary Lymph Nodes Enlarged Bilaterally] : nodes not enlarged [Epitrochlear Lymph Nodes Enlarged Bilaterally] : nodes not enlarged [Femoral Lymph Nodes Enlarged Bilaterally] : nodes not enlarged [Inguinal Lymph Nodes Enlarged Bilaterally] : nodes not enlarged [No Lymphangitis] : no lymphangitis observed [No CVA Tenderness] : no ~M costovertebral angle tenderness [No Spinal Tenderness] : no spinal tenderness [Normal Station and Gait] : the gait and station were normal [Normal Motor Tone] : the muscle tone was normal [Involuntary Movements] : no involuntary movements were seen [Normal Scalp] : inspection of the scalp showed no abnormalities [Examination Of The Hair] : texture and distribution of hair was normal [Abnormal Color] : normal color and pigmentation [Complexion Dark] : dark complexion [Skin Lesions 1] : no skin lesions were observed [Skin Turgor Decreased] : normal skin turgor [Normal] : normal texture and mobility [Deep Tendon Reflexes (DTR)] : deep tendon reflexes were 2+ and symmetric [Sensation] : the sensory exam was normal to light touch and pinprick [No Focal Deficits] : no focal deficits [Oriented To Time, Place, And Person] : oriented to person, place, and time [Impaired Insight] : insight and judgment were intact [Affect] : the affect was normal

## 2020-10-13 NOTE — HISTORY OF PRESENT ILLNESS
[Heartburn] : denies heartburn [Nausea] : denies nausea [Vomiting] : denies vomiting [Diarrhea] : denies diarrhea [Constipation] : denies constipation [Yellow Skin Or Eyes (Jaundice)] : denies jaundice [Abdominal Pain] : denies abdominal pain [Abdominal Swelling] : denies abdominal swelling [Rectal Pain] : denies rectal pain [Wt Gain ___ Lbs] : no recent weight gain [Wt Loss ___ Lbs] : no recent weight loss [GERD] : no gastroesophageal reflux disease [Hiatus Hernia] : no hiatus hernia [Peptic Ulcer Disease] : no peptic ulcer disease [Pancreatitis] : no pancreatitis [Cholelithiasis] : no cholelithiasis [Kidney Stone] : kidney stone [Inflammatory Bowel Disease] : no inflammatory bowel disease [Irritable Bowel Syndrome] : no irritable bowel syndrome [Diverticulitis] : no diverticulitis [Alcohol Abuse] : no alcohol abuse [Malignancy] : no malignancy [Abdominal Surgery] : no abdominal surgery [Appendectomy] : no appendectomy [Cholecystectomy] : no cholecystectomy [de-identified] : Pt is a 54 yr old man with hypertension who is here for scheduling a colon cancer screening.  he has a normal bm daily, no rectal bleeding, abdominal pain, or change in size or shape of his stools.  No family hx of colon cancer or polyps.  he has never had a colonoscopy in past.

## 2020-10-14 PROBLEM — I10 COMBINED SYSTOLIC AND DIASTOLIC HYPERTENSION: Status: ACTIVE | Noted: 2020-08-24

## 2020-10-14 NOTE — HISTORY OF PRESENT ILLNESS
[FreeTextEntry1] : 55 yo M with HTN and HLD who presents for initial evaluation. Patient reports he was told he had HTN for several years, but was only started on medication today after his initial PMD visit. Denies any symptoms of HTN such as headaches or vision changes. No chest pain, dyspnea, palpitations, lightheadedness, or syncope. Denies LE edema, orthopnea, or PND. Able to walk up 2 flights of stairs with no issues. \par \par

## 2020-10-14 NOTE — ASSESSMENT
[FreeTextEntry1] : 53 yo M with HTN and HLD who presents for initial evaluation.\par \par 1. HTN: Patient started today on amlodipine 10mg PO daily\par -Given how high his BP is, I doubt that he will be well controlled on 1 agent. He is due for FUV in 2 weeks with PMD,  I would consider addition of lisinopril at that time if BP is not at goal\par -Will send patient for echocardiogram to r/o LVH\par \par 2. HLD: Suboptimal lipid panel with ASCVD risk score > 10%. \par -Patient was counseled on lifestyle modifications including recommendations for diet and exercise\par -Will repeat lipid panel in 6 weeks; patient will likely need to be started on atorvastatin at that time based on recalculating ASCVD risk score. Rx for lipid panel given. \par \par FUV 2 months\par \par Will call patient with results of testing at [phone].

## 2020-10-15 LAB — BACTERIA UR CULT: NORMAL

## 2020-10-19 ENCOUNTER — APPOINTMENT (OUTPATIENT)
Dept: INTERNAL MEDICINE | Facility: CLINIC | Age: 55
End: 2020-10-19

## 2020-10-27 ENCOUNTER — APPOINTMENT (OUTPATIENT)
Dept: UROLOGY | Facility: CLINIC | Age: 55
End: 2020-10-27

## 2020-10-30 ENCOUNTER — APPOINTMENT (OUTPATIENT)
Dept: INTERNAL MEDICINE | Facility: HOSPITAL | Age: 55
End: 2020-10-30

## 2020-12-15 ENCOUNTER — APPOINTMENT (OUTPATIENT)
Dept: CARDIOLOGY | Facility: CLINIC | Age: 55
End: 2020-12-15

## 2020-12-15 ENCOUNTER — TRANSCRIPTION ENCOUNTER (OUTPATIENT)
Age: 55
End: 2020-12-15

## 2022-01-08 ENCOUNTER — TRANSCRIPTION ENCOUNTER (OUTPATIENT)
Age: 57
End: 2022-01-08

## 2023-06-07 ENCOUNTER — NON-APPOINTMENT (OUTPATIENT)
Age: 58
End: 2023-06-07

## 2023-07-17 ENCOUNTER — NON-APPOINTMENT (OUTPATIENT)
Age: 58
End: 2023-07-17

## 2023-07-17 ENCOUNTER — APPOINTMENT (OUTPATIENT)
Dept: INTERNAL MEDICINE | Facility: CLINIC | Age: 58
End: 2023-07-17
Payer: COMMERCIAL

## 2023-07-17 VITALS
OXYGEN SATURATION: 95 % | HEART RATE: 75 BPM | RESPIRATION RATE: 18 BRPM | DIASTOLIC BLOOD PRESSURE: 95 MMHG | TEMPERATURE: 98.5 F | SYSTOLIC BLOOD PRESSURE: 160 MMHG

## 2023-07-17 VITALS — SYSTOLIC BLOOD PRESSURE: 190 MMHG | DIASTOLIC BLOOD PRESSURE: 115 MMHG

## 2023-07-17 DIAGNOSIS — Z13.6 ENCOUNTER FOR SCREENING FOR CARDIOVASCULAR DISORDERS: ICD-10-CM

## 2023-07-17 DIAGNOSIS — R94.31 ABNORMAL ELECTROCARDIOGRAM [ECG] [EKG]: ICD-10-CM

## 2023-07-17 PROCEDURE — 99204 OFFICE O/P NEW MOD 45 MIN: CPT | Mod: 25

## 2023-07-17 PROCEDURE — 93000 ELECTROCARDIOGRAM COMPLETE: CPT

## 2023-07-17 RX ORDER — AMLODIPINE BESYLATE 10 MG/1
10 TABLET ORAL
Qty: 1 | Refills: 3 | Status: DISCONTINUED | COMMUNITY
Start: 2020-10-13 | End: 2023-07-17

## 2023-07-17 NOTE — HEALTH RISK ASSESSMENT
[0] : 2) Feeling down, depressed, or hopeless: Not at all (0) [PHQ-2 Negative - No further assessment needed] : PHQ-2 Negative - No further assessment needed [Never] : Never [[id number]] : 0

## 2023-07-17 NOTE — HISTORY OF PRESENT ILLNESS
[FreeTextEntry1] : establish care [de-identified] : Pt is a 56 y/o male with a PMHx of HTN that presents here today to establish care. He is generally doing well and has no acute complaints today. He has not taken his Bp medication for the past 3 years. Is not complaint with followup and meds\par Denies chest pain, SOB, BARROSO, dizziness, diaphoresis, palpitations, LE swelling, orthopnea, syncope, n/v, headache.\par Pt denies focal weakness, vision changes, numbness/tingling, dysphagia, dysarthria.\par \par

## 2023-07-20 ENCOUNTER — APPOINTMENT (OUTPATIENT)
Dept: INTERNAL MEDICINE | Facility: CLINIC | Age: 58
End: 2023-07-20

## 2023-07-27 LAB
25(OH)D3 SERPL-MCNC: 34.2 NG/ML
ALBUMIN SERPL ELPH-MCNC: 4.8 G/DL
ALP BLD-CCNC: 138 U/L
ALT SERPL-CCNC: 17 U/L
ANION GAP SERPL CALC-SCNC: 14 MMOL/L
APPEARANCE: CLEAR
AST SERPL-CCNC: 24 U/L
BACTERIA: NEGATIVE /HPF
BILIRUB SERPL-MCNC: 0.8 MG/DL
BILIRUBIN URINE: NEGATIVE
BLOOD URINE: NEGATIVE
BUN SERPL-MCNC: 15 MG/DL
C TRACH RRNA SPEC QL NAA+PROBE: NOT DETECTED
CALCIUM SERPL-MCNC: 9.5 MG/DL
CAST: 1 /LPF
CHLORIDE SERPL-SCNC: 102 MMOL/L
CHOLEST SERPL-MCNC: 241 MG/DL
CK SERPL-CCNC: 143 U/L
CO2 SERPL-SCNC: 26 MMOL/L
COLOR: YELLOW
CREAT SERPL-MCNC: 1.22 MG/DL
CREAT SPEC-SCNC: 282 MG/DL
EGFR: 69 ML/MIN/1.73M2
EPITHELIAL CELLS: 3 /HPF
ESTIMATED AVERAGE GLUCOSE: 120 MG/DL
FERRITIN SERPL-MCNC: 44 NG/ML
FOLATE SERPL-MCNC: >20 NG/ML
GLUCOSE QUALITATIVE U: NEGATIVE MG/DL
GLUCOSE SERPL-MCNC: 84 MG/DL
HAV IGM SER QL: NONREACTIVE
HBA1C MFR BLD HPLC: 5.8 %
HBV CORE IGM SER QL: NONREACTIVE
HBV SURFACE AG SER QL: NONREACTIVE
HCV AB SER QL: NONREACTIVE
HCV S/CO RATIO: 0.2 S/CO
HDLC SERPL-MCNC: 67 MG/DL
HIV1+2 AB SPEC QL IA.RAPID: NONREACTIVE
IRON SATN MFR SERPL: 17 %
IRON SERPL-MCNC: 69 UG/DL
KETONES URINE: NEGATIVE MG/DL
LDLC SERPL CALC-MCNC: 160 MG/DL
LEUKOCYTE ESTERASE URINE: NEGATIVE
MICROALBUMIN 24H UR DL<=1MG/L-MCNC: 6.3 MG/DL
MICROALBUMIN/CREAT 24H UR-RTO: 22 MG/G
MICROSCOPIC-UA: NORMAL
N GONORRHOEA RRNA SPEC QL NAA+PROBE: NOT DETECTED
NITRITE URINE: NEGATIVE
NONHDLC SERPL-MCNC: 174 MG/DL
PH URINE: 5.5
POTASSIUM SERPL-SCNC: 4.3 MMOL/L
PROT SERPL-MCNC: 8.3 G/DL
PROTEIN URINE: 30 MG/DL
PSA SERPL-MCNC: 0.81 NG/ML
RED BLOOD CELLS URINE: 1 /HPF
SODIUM SERPL-SCNC: 142 MMOL/L
SOURCE AMPLIFICATION: NORMAL
SPECIFIC GRAVITY URINE: 1.03
T4 FREE SERPL-MCNC: 1 NG/DL
TIBC SERPL-MCNC: 414 UG/DL
TRIGL SERPL-MCNC: 82 MG/DL
TSH SERPL-ACNC: 1.6 UIU/ML
UIBC SERPL-MCNC: 346 UG/DL
URATE SERPL-MCNC: 6.1 MG/DL
UROBILINOGEN URINE: 1 MG/DL
VIT B12 SERPL-MCNC: 333 PG/ML
WHITE BLOOD CELLS URINE: 1 /HPF

## 2023-07-28 LAB
HSV 1+2 IGG SER IA-IMP: NEGATIVE
HSV 1+2 IGG SER IA-IMP: POSITIVE
HSV1 IGG SER QL: >62.2 INDEX
HSV2 IGG SER QL: 0.88 INDEX
T PALLIDUM AB SER QL IA: NEGATIVE

## 2023-08-03 ENCOUNTER — APPOINTMENT (OUTPATIENT)
Dept: INTERNAL MEDICINE | Facility: CLINIC | Age: 58
End: 2023-08-03

## 2023-08-16 ENCOUNTER — APPOINTMENT (OUTPATIENT)
Dept: CARDIOLOGY | Facility: CLINIC | Age: 58
End: 2023-08-16

## 2024-07-02 ENCOUNTER — NON-APPOINTMENT (OUTPATIENT)
Age: 59
End: 2024-07-02

## 2024-07-02 ENCOUNTER — APPOINTMENT (OUTPATIENT)
Dept: INTERNAL MEDICINE | Facility: CLINIC | Age: 59
End: 2024-07-02
Payer: COMMERCIAL

## 2024-07-02 VITALS
HEIGHT: 72 IN | SYSTOLIC BLOOD PRESSURE: 160 MMHG | WEIGHT: 175 LBS | TEMPERATURE: 97.7 F | HEART RATE: 67 BPM | BODY MASS INDEX: 23.7 KG/M2 | DIASTOLIC BLOOD PRESSURE: 100 MMHG | OXYGEN SATURATION: 98 %

## 2024-07-02 DIAGNOSIS — I10 ESSENTIAL (PRIMARY) HYPERTENSION: ICD-10-CM

## 2024-07-02 DIAGNOSIS — Z13.228 ENCOUNTER FOR SCREENING FOR OTHER METABOLIC DISORDERS: ICD-10-CM

## 2024-07-02 DIAGNOSIS — E78.00 PURE HYPERCHOLESTEROLEMIA, UNSPECIFIED: ICD-10-CM

## 2024-07-02 DIAGNOSIS — R76.12 NONSPECIFIC REACTION TO CELL MEDIATED IMMUNITY MEASUREMENT OF GAMMA INTERFERON ANTIGEN RESPONSE W/OUT ACTIVE TUBERCULOSIS: ICD-10-CM

## 2024-07-02 DIAGNOSIS — Z11.3 ENCOUNTER FOR SCREENING FOR INFECTIONS WITH A PREDOMINANTLY SEXUAL MODE OF TRANSMISSION: ICD-10-CM

## 2024-07-02 DIAGNOSIS — Z12.9 ENCOUNTER FOR SCREENING FOR MALIGNANT NEOPLASM, SITE UNSPECIFIED: ICD-10-CM

## 2024-07-02 PROCEDURE — 93000 ELECTROCARDIOGRAM COMPLETE: CPT

## 2024-07-02 PROCEDURE — 99386 PREV VISIT NEW AGE 40-64: CPT

## 2024-07-05 LAB
APPEARANCE: CLEAR
BACTERIA: NEGATIVE /HPF
BILIRUBIN URINE: NEGATIVE
BLOOD URINE: NEGATIVE
C TRACH RRNA SPEC QL NAA+PROBE: NOT DETECTED
CAST: 1 /LPF
COLOR: YELLOW
EPITHELIAL CELLS: 1 /HPF
GLUCOSE QUALITATIVE U: NEGATIVE MG/DL
KETONES URINE: NEGATIVE MG/DL
LEUKOCYTE ESTERASE URINE: ABNORMAL
MICROSCOPIC-UA: NORMAL
N GONORRHOEA RRNA SPEC QL NAA+PROBE: NOT DETECTED
NITRITE URINE: NEGATIVE
PH URINE: 6
PROTEIN URINE: NORMAL MG/DL
RED BLOOD CELLS URINE: 1 /HPF
SOURCE AMPLIFICATION: NORMAL
SPECIFIC GRAVITY URINE: 1.02
UROBILINOGEN URINE: 1 MG/DL
WHITE BLOOD CELLS URINE: 1 /HPF

## 2024-07-15 ENCOUNTER — APPOINTMENT (OUTPATIENT)
Dept: INTERNAL MEDICINE | Facility: CLINIC | Age: 59
End: 2024-07-15

## 2024-07-29 ENCOUNTER — APPOINTMENT (OUTPATIENT)
Dept: GASTROENTEROLOGY | Facility: CLINIC | Age: 59
End: 2024-07-29

## 2024-09-12 ENCOUNTER — APPOINTMENT (OUTPATIENT)
Dept: CARDIOLOGY | Facility: CLINIC | Age: 59
End: 2024-09-12